# Patient Record
Sex: FEMALE
[De-identification: names, ages, dates, MRNs, and addresses within clinical notes are randomized per-mention and may not be internally consistent; named-entity substitution may affect disease eponyms.]

---

## 2022-03-29 ENCOUNTER — RX ONLY (RX ONLY)
Age: 77
End: 2022-03-29

## 2022-03-29 ENCOUNTER — COMPLETE SKIN EXAM (OUTPATIENT)
Dept: URBAN - METROPOLITAN AREA CLINIC 17 | Facility: CLINIC | Age: 77
Setting detail: DERMATOLOGY
End: 2022-03-29

## 2022-03-29 DIAGNOSIS — L81.4 OTHER MELANIN HYPERPIGMENTATION: ICD-10-CM

## 2022-03-29 PROBLEM — L82.1 OTHER SEBORRHEIC KERATOSIS: Status: RESOLVED | Noted: 2022-03-29

## 2022-03-29 PROBLEM — B07.8 OTHER VIRAL WARTS: Status: RESOLVED | Noted: 2022-03-29

## 2022-03-29 PROBLEM — L57.0 ACTINIC KERATOSIS: Status: RESOLVED | Noted: 2022-03-29

## 2022-03-29 PROBLEM — B35.1 TINEA UNGUIUM: Status: RESOLVED | Noted: 2022-03-29

## 2022-03-29 PROBLEM — L71.8 OTHER ROSACEA: Status: RESOLVED | Noted: 2022-03-29

## 2022-03-29 PROBLEM — D18.01 HEMANGIOMA OF SKIN AND SUBCUTANEOUS TISSUE: Status: RESOLVED | Noted: 2022-03-29

## 2022-03-29 PROCEDURE — 99204 OFFICE O/P NEW MOD 45 MIN: CPT

## 2022-03-29 PROCEDURE — 17110 DESTRUCT B9 LESION 1-14: CPT

## 2022-03-29 PROCEDURE — 17000 DESTRUCT PREMALG LESION: CPT

## 2022-03-29 RX ORDER — CICLOPIROX 80 MG/ML
SOLUTION TOPICAL
Qty: 6.6 | Refills: 3
Start: 2022-03-29

## 2022-03-29 RX ORDER — METRONIDAZOLE 7.5 MG/G
CREAM TOPICAL TWICE A DAY
Qty: 45 | Refills: 3
Start: 2022-03-29

## 2022-06-29 ENCOUNTER — APPOINTMENT (OUTPATIENT)
Dept: URBAN - METROPOLITAN AREA CLINIC 191 | Age: 77
Setting detail: DERMATOLOGY
End: 2022-06-30

## 2022-06-29 DIAGNOSIS — L81.4 OTHER MELANIN HYPERPIGMENTATION: ICD-10-CM

## 2022-06-29 DIAGNOSIS — B35.1 TINEA UNGUIUM: ICD-10-CM

## 2022-06-29 DIAGNOSIS — L82.1 OTHER SEBORRHEIC KERATOSIS: ICD-10-CM

## 2022-06-29 DIAGNOSIS — L71.8 OTHER ROSACEA: ICD-10-CM

## 2022-06-29 DIAGNOSIS — L60.8 OTHER NAIL DISORDERS: ICD-10-CM

## 2022-06-29 PROCEDURE — OTHER PRESCRIPTION MEDICATION MANAGEMENT: OTHER

## 2022-06-29 PROCEDURE — OTHER TREATMENT REGIMEN: OTHER

## 2022-06-29 PROCEDURE — 99213 OFFICE O/P EST LOW 20 MIN: CPT

## 2022-06-29 PROCEDURE — OTHER COUNSELING: OTHER

## 2022-06-29 ASSESSMENT — LOCATION SIMPLE DESCRIPTION DERM
LOCATION SIMPLE: UPPER BACK
LOCATION SIMPLE: LEFT GREAT TOE
LOCATION SIMPLE: RIGHT GREAT TOE
LOCATION SIMPLE: RIGHT TEMPLE
LOCATION SIMPLE: CHEST
LOCATION SIMPLE: LEFT INDEX FINGERNAIL
LOCATION SIMPLE: LEFT CHEEK
LOCATION SIMPLE: RIGHT CHEEK
LOCATION SIMPLE: ABDOMEN
LOCATION SIMPLE: NECK

## 2022-06-29 ASSESSMENT — LOCATION ZONE DERM
LOCATION ZONE: TOENAIL
LOCATION ZONE: FACE
LOCATION ZONE: FINGERNAIL
LOCATION ZONE: NECK
LOCATION ZONE: TRUNK

## 2022-06-29 ASSESSMENT — LOCATION DETAILED DESCRIPTION DERM
LOCATION DETAILED: RIGHT CENTRAL MALAR CHEEK
LOCATION DETAILED: INFERIOR THORACIC SPINE
LOCATION DETAILED: LEFT GREAT TOENAIL
LOCATION DETAILED: RIGHT SUPERIOR LATERAL NECK
LOCATION DETAILED: RIGHT GREAT TOENAIL
LOCATION DETAILED: LEFT INDEX FINGERNAIL
LOCATION DETAILED: RIGHT CENTRAL TEMPLE
LOCATION DETAILED: MIDDLE STERNUM
LOCATION DETAILED: LEFT MEDIAL MALAR CHEEK
LOCATION DETAILED: RIGHT LATERAL ABDOMEN

## 2022-06-29 NOTE — PROCEDURE: TREATMENT REGIMEN
Plan: Continue taking 5 mg biotin every day, but hold off for 3 days before blood work \\nContinue applying Ruth Browne Nail hardening liquors and keep nails filed down\\nMonitor nail. Patient will call back if erythronychia gets worse
Detail Level: Zone

## 2022-06-29 NOTE — PROCEDURE: PRESCRIPTION MEDICATION MANAGEMENT
Render In Strict Bullet Format?: No
Detail Level: Zone
Plan: Continue Ciclopirox 8% solution- apply to nails once a day and remove once a week with rubbing alcohol
Plan: Continue applying Metronidazole cream twice daily, recommended gentle cleansers and moisturizers.

## 2022-06-29 NOTE — HPI: OTHER
Condition:: Follow up onychoschizia
Please Describe Your Condition:: Patient is taking 5 mg biotin every day and has seen an improvement.

## 2023-01-04 ENCOUNTER — APPOINTMENT (OUTPATIENT)
Dept: URBAN - METROPOLITAN AREA CLINIC 191 | Age: 78
Setting detail: DERMATOLOGY
End: 2023-01-15

## 2023-01-04 DIAGNOSIS — L60.8 OTHER NAIL DISORDERS: ICD-10-CM

## 2023-01-04 DIAGNOSIS — B07.8 OTHER VIRAL WARTS: ICD-10-CM

## 2023-01-04 DIAGNOSIS — D18.0 HEMANGIOMA: ICD-10-CM

## 2023-01-04 DIAGNOSIS — L82.0 INFLAMED SEBORRHEIC KERATOSIS: ICD-10-CM

## 2023-01-04 DIAGNOSIS — L71.8 OTHER ROSACEA: ICD-10-CM

## 2023-01-04 DIAGNOSIS — L82.1 OTHER SEBORRHEIC KERATOSIS: ICD-10-CM

## 2023-01-04 DIAGNOSIS — L81.4 OTHER MELANIN HYPERPIGMENTATION: ICD-10-CM

## 2023-01-04 DIAGNOSIS — B35.1 TINEA UNGUIUM: ICD-10-CM

## 2023-01-04 PROBLEM — D23.5 OTHER BENIGN NEOPLASM OF SKIN OF TRUNK: Status: ACTIVE | Noted: 2023-01-04

## 2023-01-04 PROBLEM — D18.01 HEMANGIOMA OF SKIN AND SUBCUTANEOUS TISSUE: Status: ACTIVE | Noted: 2023-01-04

## 2023-01-04 PROCEDURE — 99213 OFFICE O/P EST LOW 20 MIN: CPT | Mod: 25

## 2023-01-04 PROCEDURE — OTHER DEFER: OTHER

## 2023-01-04 PROCEDURE — OTHER REASSURANCE: OTHER

## 2023-01-04 PROCEDURE — OTHER PRESCRIPTION: OTHER

## 2023-01-04 PROCEDURE — OTHER OBSERVATION: OTHER

## 2023-01-04 PROCEDURE — OTHER MIPS QUALITY: OTHER

## 2023-01-04 PROCEDURE — OTHER COUNSELING: OTHER

## 2023-01-04 PROCEDURE — OTHER LIQUID NITROGEN: OTHER

## 2023-01-04 PROCEDURE — OTHER TREATMENT REGIMEN: OTHER

## 2023-01-04 PROCEDURE — OTHER OBSERVATION AND MEASURE: OTHER

## 2023-01-04 PROCEDURE — 17110 DESTRUCT B9 LESION 1-14: CPT

## 2023-01-04 PROCEDURE — OTHER PRESCRIPTION MEDICATION MANAGEMENT: OTHER

## 2023-01-04 RX ORDER — CICLOPIROX 80 MG/ML
SOLUTION TOPICAL AS DIRECTED
Qty: 19.8 | Refills: 3 | Status: ERX | COMMUNITY
Start: 2023-01-04

## 2023-01-04 ASSESSMENT — LOCATION DETAILED DESCRIPTION DERM
LOCATION DETAILED: RIGHT GREAT TOENAIL
LOCATION DETAILED: RIGHT CENTRAL MALAR CHEEK
LOCATION DETAILED: MIDDLE STERNUM
LOCATION DETAILED: LEFT INDEX FINGERNAIL
LOCATION DETAILED: LEFT GREAT TOENAIL
LOCATION DETAILED: RIGHT PROXIMAL PRETIBIAL REGION
LOCATION DETAILED: LEFT DISTAL PRETIBIAL REGION
LOCATION DETAILED: RIGHT DISTAL CALF
LOCATION DETAILED: RIGHT SUPERIOR MEDIAL UPPER BACK
LOCATION DETAILED: RIGHT RIB CAGE
LOCATION DETAILED: LEFT ANTERIOR DISTAL THIGH
LOCATION DETAILED: LOWER STERNUM
LOCATION DETAILED: LEFT MEDIAL MALAR CHEEK
LOCATION DETAILED: LEFT RIB CAGE
LOCATION DETAILED: LEFT PROXIMAL CALF
LOCATION DETAILED: LEFT SUPERIOR LATERAL MIDBACK
LOCATION DETAILED: RIGHT ANTERIOR DISTAL THIGH
LOCATION DETAILED: PERIUMBILICAL SKIN
LOCATION DETAILED: RIGHT INFERIOR UPPER BACK

## 2023-01-04 ASSESSMENT — LOCATION ZONE DERM
LOCATION ZONE: LEG
LOCATION ZONE: TRUNK
LOCATION ZONE: TOENAIL
LOCATION ZONE: FACE
LOCATION ZONE: FINGERNAIL

## 2023-01-04 ASSESSMENT — LOCATION SIMPLE DESCRIPTION DERM
LOCATION SIMPLE: RIGHT PRETIBIAL REGION
LOCATION SIMPLE: LEFT THIGH
LOCATION SIMPLE: RIGHT CHEEK
LOCATION SIMPLE: RIGHT UPPER BACK
LOCATION SIMPLE: LEFT LOWER BACK
LOCATION SIMPLE: ABDOMEN
LOCATION SIMPLE: LEFT PRETIBIAL REGION
LOCATION SIMPLE: RIGHT CALF
LOCATION SIMPLE: RIGHT THIGH
LOCATION SIMPLE: LEFT CHEEK
LOCATION SIMPLE: CHEST
LOCATION SIMPLE: LEFT INDEX FINGERNAIL
LOCATION SIMPLE: RIGHT GREAT TOE
LOCATION SIMPLE: LEFT CALF
LOCATION SIMPLE: LEFT GREAT TOE

## 2023-01-04 NOTE — PROCEDURE: LIQUID NITROGEN
Detail Level: Detailed
Show Spray Paint Technique Variable?: Yes
Post-Care Instructions: I reviewed with the patient in detail post-care instructions. Patient is to wear sunprotection, and avoid picking at any of the treated lesions. Pt may apply Vaseline to crusted or scabbing areas.
Medical Necessity Information: It is in your best interest to select a reason for this procedure from the list below. All of these items fulfill various CMS LCD requirements except the new and changing color options.
Consent: The patient's consent was obtained including but not limited to risks of crusting, scabbing, blistering, scarring, darker or lighter pigmentary change, recurrence, incomplete removal and infection.
Spray Paint Technique: No
Medical Necessity Clause: This procedure was medically necessary because the lesions that were treated were:
Spray Paint Text: The liquid nitrogen was applied to the skin utilizing a spray paint frosting technique.
Number Of Freeze-Thaw Cycles: 1 freeze-thaw cycle

## 2023-01-04 NOTE — PROCEDURE: PRESCRIPTION MEDICATION MANAGEMENT
Render In Strict Bullet Format?: No
Detail Level: Zone
Plan: Continue Ciclopirox 8% solution- apply to nails once a day and remove once a week with rubbing alcohol
Plan: Continue applying Metronidazole cream twice daily, recommended gentle cleansers and moisturizers.\\nPt was instructed to call our office back if she needs more Metronidazole

## 2023-01-04 NOTE — PROCEDURE: DEFER
Detail Level: Detailed
Introduction Text (Please End With A Colon): The following procedure was deferred:
Size Of Lesion In Cm (Optional): 0
Other Procedure: cosmetic freezing

## 2023-01-04 NOTE — PROCEDURE: TREATMENT REGIMEN
Plan: Continue taking 5 mg biotin every day, but hold off for 3 days before blood work \\nContinue applying Ruth Browne Nail hardening liquors and keep nails filed down\\nMonitor nail. Patient will call back if erythronychia worsens\\nPhoto taken
Detail Level: Zone